# Patient Record
Sex: FEMALE | Race: OTHER | URBAN - METROPOLITAN AREA
[De-identification: names, ages, dates, MRNs, and addresses within clinical notes are randomized per-mention and may not be internally consistent; named-entity substitution may affect disease eponyms.]

---

## 2021-11-30 ENCOUNTER — VIRTUAL VISIT (OUTPATIENT)
Dept: FAMILY MEDICINE CLINIC | Age: 31
End: 2021-11-30

## 2021-11-30 DIAGNOSIS — M25.50 ARTHRALGIA, UNSPECIFIED JOINT: ICD-10-CM

## 2021-11-30 DIAGNOSIS — K42.9 UMBILICAL HERNIA WITHOUT OBSTRUCTION AND WITHOUT GANGRENE: ICD-10-CM

## 2021-11-30 DIAGNOSIS — B34.9 VIRAL ILLNESS: Primary | ICD-10-CM

## 2021-11-30 PROCEDURE — 99441 PR PHYS/QHP TELEPHONE EVALUATION 5-10 MIN: CPT | Performed by: FAMILY MEDICINE

## 2021-11-30 NOTE — PROGRESS NOTES
Catrina Moise (: ) is a 32 y.o. female, new patient, here for evaluation of the following chief complaint(s):   Flu Like Symptoms (since Thursday, denies cough, denies fever)       ASSESSMENT/PLAN:  1. Viral illness  Mild viral sx with more generalized aches. Will have her start Vitamin D3 OTC and follow up in 1 week for general exam.  2. Arthralgia, unspecified joint  As above  3. Umbilical hernia without obstruction and without gangrene  This is bothering her more and she is interested in having it repaired. Will have her make F2F for exam and referral.    Return for follow up for F2F in 1 week for check up and umbilical hernia check. SUBJECTIVE/OBJECTIVE:  HPI  Aches: headaches x 2 weeks, general body aches x 6 days with mild sore throat, nasal congestion. No cough, fevers, chills. Umbilical Hernia:  Present for 5 years. Hurts at times. Review of Systems   Constitutional: Negative for appetite change, chills, diaphoresis, fatigue and fever. HENT: Positive for congestion and sore throat. Negative for sinus pain. Respiratory: Negative for cough, shortness of breath and wheezing. Cardiovascular: Negative for chest pain. Gastrointestinal: Negative for abdominal pain, constipation and diarrhea. Patient-Reported Weight: 125lb  Patient-Reported LMP: 10/06/21    Physical Exam    On this date 2021 I have spent 6 minutes reviewing previous notes, test results and face to face (virtual) with the patient discussing the diagnosis and importance of compliance with the treatment plan as well as documenting on the day of the visit. Catrina Moise, was evaluated through a synchronous (real-time) audio-video encounter. The patient (or guardian if applicable) is aware that this is a billable service. Verbal consent to proceed has been obtained within the past 12 months.  The visit was conducted pursuant to the emergency declaration under the [address] and the National Emergencies Act, [facility] waiver authority and the [employer] General Act. Patient identification was verified, and a caregiver was present when appropriate. The patient was located in a state where the provider was credentialed to provide care. Patient identification was verified at the start of the visit: YES    Services were provided through a phone synchronous discussion virtually to substitute for in-person clinic visit. Patient was located at home and provider was located in office or at home. An electronic signature was used to authenticate this note.   -- Dari Martin MD

## 2021-11-30 NOTE — PROGRESS NOTES
Patient's AVS was printed and placed in the mail to her home address. No new prescriptions written today.  The recommended 1 week F2F provider follow-up appointment has been scheduled by the [facility] for 12-03-21 with Dr. Issa Fuller, RN

## 2021-12-03 ENCOUNTER — OFFICE VISIT (OUTPATIENT)
Dept: FAMILY MEDICINE CLINIC | Age: 31
End: 2021-12-03

## 2021-12-03 VITALS
DIASTOLIC BLOOD PRESSURE: 66 MMHG | WEIGHT: 135.6 LBS | TEMPERATURE: 97.9 F | SYSTOLIC BLOOD PRESSURE: 101 MMHG | BODY MASS INDEX: 28.46 KG/M2 | HEIGHT: 58 IN | HEART RATE: 69 BPM | OXYGEN SATURATION: 97 %

## 2021-12-03 DIAGNOSIS — B34.9 VIRAL ILLNESS: Primary | ICD-10-CM

## 2021-12-03 DIAGNOSIS — Z23 ENCOUNTER FOR IMMUNIZATION: ICD-10-CM

## 2021-12-03 DIAGNOSIS — K42.9 UMBILICAL HERNIA WITHOUT OBSTRUCTION AND WITHOUT GANGRENE: ICD-10-CM

## 2021-12-03 PROCEDURE — 91300 COVID-19, MRNA, LNP-S, PF, 30MCG/0.3ML DOSE(PFIZER): CPT

## 2021-12-03 PROCEDURE — 99213 OFFICE O/P EST LOW 20 MIN: CPT | Performed by: FAMILY MEDICINE

## 2021-12-03 PROCEDURE — 0001A COVID-19, MRNA, LNP-S, PF, 30MCG/0.3ML DOSE(PFIZER): CPT

## 2021-12-03 NOTE — PROGRESS NOTES
I have copied the provider's check out note here and have reviewed these items with the patient today: Check-out Note: Patient instructions   AN referral entered to General Surgery. I explained the process for [facility] with the patient. Patient verbalized understanding. The patient stated that she has not been seen by any other provider for her surgical diagnosis. Patient verbalized understanding.  Lucía Kellogg RN

## 2021-12-03 NOTE — PROGRESS NOTES
Regina Felipe (: 1990) is a 32 y.o. female, established patient, here for evaluation of the following chief complaint(s):  Umbilical Hernia ( VV FU) and Other (Sore throat)       ASSESSMENT/PLAN:  1. Viral illness  Overall resolved with some post viral diarrhea. Avoid foods that make it worse and Lactose containing foods. If diarrhea persists or worsens return for further evaluation. 2. Umbilical hernia without obstruction and without gangrene  -     REFERRAL TO GENERAL SURGERY          SUBJECTIVE:  HPI  Has been in [place] over 2 years. Viral Illness:  Sx have resolved. Her generalized aches have also improved since we spoke. Still notices nausea and diarrhea off/on after certain foods. No hematochezia. Umbilical Hernia:  Present for 5+ years. Hurts at times. PMHx:  Pregnancy 2 years ago and was told labs were normal.  Had miscarriage 6 months ago with labs and D&C. FHx:  No DM    Review of Systems   Constitutional: Negative for diaphoresis, fatigue and fever. Respiratory: Negative for cough, chest tightness and shortness of breath. Cardiovascular: Negative for chest pain, palpitations and leg swelling. Gastrointestinal: Positive for diarrhea. Negative for abdominal pain and constipation. Neurological: Negative for syncope and light-headedness. OBJECTIVE:  Blood pressure 101/66, pulse 69, temperature 97.9 °F (36.6 °C), temperature source Temporal, height 4' 9.95\" (1.472 m), weight 135 lb 9.6 oz (61.5 kg), last menstrual period 2021, SpO2 97 %. Physical Exam  CONSTITUTIONAL:  Well developed. No apparent distress. PSYCHIATRIC: Oriented to time, place, person & situation. Appropriate mood and affect. HEENT:  Sclera clear. External canal clear, TM dull B without erythema. Throat with mild PND, no erythema. NECK:  Normal inspection, normal palpation without any lymphadenopathy, masses, or thyromegaly  CARDIOVASCULAR:  Regular rate and rhythm.   Normal S1, S2.  No extra sounds. RESPIRATORY:  Normal effort. Normal ascultation without wheezing. ABDOMEN:  Normal bowel sounds. Abdomen soft, non tender. No hepatosplenomegaly or masses. 3 cm NT, reducible umbilical hernia. VASCULAR:  Normal Posterior Tibialis pulses. EXTREMITIES:  No edema. No results found for this visit on 12/03/21. An electronic signature was used to authenticate this note.   -- Teri Patel MD

## 2021-12-03 NOTE — PATIENT INSTRUCTIONS
Use Vitamin D3 1,[address].
Next dose of Tylenol will be on or after _7:45pm__________ ,today/tonight, If needed for pain/cramps. Your first dose of Tylenol was given at __1:45 pm_________. Do not exceed more than 4000mg of Tylenol in one 24 hour setting.

## 2021-12-06 ENCOUNTER — TELEPHONE (OUTPATIENT)
Dept: FAMILY MEDICINE CLINIC | Age: 31
End: 2021-12-06

## 2021-12-06 NOTE — TELEPHONE ENCOUNTER
VINAY.VINAY. OW called patient and started financial screening for AN. Pending POI. Patient will call OW to schedule appt.  when she has POI

## 2022-01-04 ENCOUNTER — TELEPHONE (OUTPATIENT)
Dept: FAMILY MEDICINE CLINIC | Age: 32
End: 2022-01-04

## 2022-01-04 NOTE — TELEPHONE ENCOUNTER
ZIGGY. OW called patient and was unable to speak with patient or leave a message. Text message sent to patient asking to call OW back.

## 2022-01-05 ENCOUNTER — OFFICE VISIT (OUTPATIENT)
Dept: FAMILY MEDICINE CLINIC | Age: 32
End: 2022-01-05

## 2022-01-05 DIAGNOSIS — Z71.89 COUNSELING AND COORDINATION OF CARE: Primary | ICD-10-CM

## 2022-01-05 PROCEDURE — 99080 SPECIAL REPORTS OR FORMS: CPT | Performed by: PHYSICIAN ASSISTANT

## 2022-01-05 NOTE — PROGRESS NOTES
RIP PAINTER called patient to F/UP with AN pending documents. An appt was made for 1/11/22 at 11:45am. Patient replied NO to all covid19 screening questions.

## 2022-01-10 ENCOUNTER — TELEPHONE (OUTPATIENT)
Dept: FAMILY MEDICINE CLINIC | Age: 32
End: 2022-01-10

## 2022-01-10 NOTE — TELEPHONE ENCOUNTER
YOAN Gale called pt to reschedule appt for AN PATIENTS [facility] Aug's clinic has been cancelled) but was unable to speak with patient. OW left a vm asking pt to call OW back.

## 2022-01-11 ENCOUNTER — IMMUNIZATION (OUTPATIENT)
Dept: FAMILY MEDICINE CLINIC | Age: 32
End: 2022-01-11

## 2022-01-11 DIAGNOSIS — Z23 ENCOUNTER FOR IMMUNIZATION: Primary | ICD-10-CM

## 2022-01-11 PROCEDURE — 0002A COVID-19, MRNA, LNP-S, PF, 30MCG/0.3ML DOSE(PFIZER): CPT

## 2022-01-11 PROCEDURE — 91300 COVID-19, MRNA, LNP-S, PF, 30MCG/0.3ML DOSE(PFIZER): CPT

## 2022-01-18 ENCOUNTER — TELEPHONE (OUTPATIENT)
Dept: FAMILY MEDICINE CLINIC | Age: 32
End: 2022-01-18

## 2022-01-18 NOTE — TELEPHONE ENCOUNTER
YOAN PAINTER called patient and was unable to speak with patient or leave a message. An automated message saying \"Welcome to [address]. Your call cannot be completed this time\". IN[employer] sent a text message to pt asking to call back.

## 2022-01-21 ENCOUNTER — VIRTUAL VISIT (OUTPATIENT)
Dept: FAMILY MEDICINE CLINIC | Age: 32
End: 2022-01-21

## 2022-01-21 DIAGNOSIS — Z71.89 COUNSELING AND COORDINATION OF CARE: Primary | ICD-10-CM

## 2022-01-21 PROCEDURE — 99080 SPECIAL REPORTS OR FORMS: CPT | Performed by: PHYSICIAN ASSISTANT

## 2022-01-21 NOTE — PROGRESS NOTES
YOAN PAINTER called pt to f/up with AN. An appt was made for 1/25/22 at 10:30am. Patient replied NO to all covid19 screening questions. Pending POI.

## 2022-01-28 ENCOUNTER — TELEPHONE (OUTPATIENT)
Dept: FAMILY MEDICINE CLINIC | Age: 32
End: 2022-01-28

## 2022-01-28 NOTE — TELEPHONE ENCOUNTER
Patient has not provided required documents to complete AN referral process. After several attempts to complete application and a No Show, OW is withdrawing application. Staff message sent to Isaiah Louie and patient's provider.

## 2022-02-08 ENCOUNTER — OFFICE VISIT (OUTPATIENT)
Dept: FAMILY MEDICINE CLINIC | Age: 32
End: 2022-02-08

## 2022-02-08 DIAGNOSIS — Z71.89 COUNSELING AND COORDINATION OF CARE: Primary | ICD-10-CM

## 2022-02-08 PROCEDURE — 99080 SPECIAL REPORTS OR FORMS: CPT | Performed by: PHYSICIAN ASSISTANT

## 2022-02-08 NOTE — PROGRESS NOTES
OW met with patient and helped with AN application. Application was completed. OW instructed pt to call AN on/after 2/22/22. Patient was screened for SDOH. She chose Food as primary need. Not on food stamps. 4 children in the household. OW provided information about [employer].

## 2022-02-09 ENCOUNTER — TELEPHONE (OUTPATIENT)
Dept: FAMILY MEDICINE CLINIC | Age: 32
End: 2022-02-09

## 2022-02-23 ENCOUNTER — TELEPHONE (OUTPATIENT)
Dept: FAMILY MEDICINE CLINIC | Age: 32
End: 2022-02-23

## 2022-02-23 NOTE — TELEPHONE ENCOUNTER
V.V. Pt called OW to ask about AN appointment. OW instructed patient to call AN and confirm her referral. Pt said she had called AN and the office was closed. OW reminded AN phone number and business hours to patient and sent AN orange flyer to patient via text message. OW asked patient to call AN again and try calling during AN business hours. Pt verbalized understanding.

## 2022-02-23 NOTE — TELEPHONE ENCOUNTER
ZIGGY. OW called pt to f/up with AN. OW told the patient that patient's referral had been closed because it was placed more than 2 months ago and patient needs to schedule appt with [facility] provider again. Patient verbalized understanding. OW provided information to patient about how to schedule appt with [facility].

## 2022-03-08 ENCOUNTER — OFFICE VISIT (OUTPATIENT)
Dept: FAMILY MEDICINE CLINIC | Age: 32
End: 2022-03-08

## 2022-03-08 VITALS
OXYGEN SATURATION: 99 % | DIASTOLIC BLOOD PRESSURE: 65 MMHG | TEMPERATURE: 97.3 F | BODY MASS INDEX: 30.2 KG/M2 | HEIGHT: 57 IN | WEIGHT: 140 LBS | HEART RATE: 82 BPM | SYSTOLIC BLOOD PRESSURE: 92 MMHG

## 2022-03-08 DIAGNOSIS — Z71.89 COUNSELING AND COORDINATION OF CARE: Primary | ICD-10-CM

## 2022-03-08 DIAGNOSIS — Z09 HOSPITAL DISCHARGE FOLLOW-UP: ICD-10-CM

## 2022-03-08 DIAGNOSIS — K42.9 UMBILICAL HERNIA WITHOUT OBSTRUCTION AND WITHOUT GANGRENE: Primary | ICD-10-CM

## 2022-03-08 PROCEDURE — 99214 OFFICE O/P EST MOD 30 MIN: CPT | Performed by: NURSE PRACTITIONER

## 2022-03-08 PROCEDURE — 1111F DSCHRG MED/CURRENT MED MERGE: CPT | Performed by: NURSE PRACTITIONER

## 2022-03-08 PROCEDURE — 99080 SPECIAL REPORTS OR FORMS: CPT | Performed by: PHYSICIAN ASSISTANT

## 2022-03-08 NOTE — PROGRESS NOTES
An After Visit Summary was printed and given to the patient.   Patient signed a medical release form to retrieve records from [facility].

## 2022-03-08 NOTE — PROGRESS NOTES
Patient was seen by provider and referred to AN. Pt came to OW as a Walk-in. OW explained to patient that her previous AN application was withdrawn and a new one needs to be completed. Other OW will contact pt for AN application. Pt verbalized understanding. Pt also has some pending bills from [facility]. OW provided information about FA for [address] [place] [address].

## 2022-03-08 NOTE — PROGRESS NOTES
3/8/2022 : Jlil Jamison (: 1990) is a 32 y.o. female, established patient, here for evaluation of the following chief complaint(s):  Abdominal Pain (Pt states has a umbilical hernia and she needs the specialis referral) and Hospital Follow Up     ASSESSMENT/PLAN:  Below is the assessment and plan developed based on review of pertinent history, physical exam, labs, studies, and medications. 1. Umbilical hernia without obstruction and without gangrene  -     REFERRAL TO SURGERY    Return for if Efrain Ramirez available today can she see this patient for [facility]? .    SUBJECTIVE/OBJECTIVE:  HPI 3/2/22 [facility], umbilical hernia. It was a new symptom. Pain around the abdomen. She had vomiting and diarrhea. Given IV medication for pain. This is an inguinal hernia. Pain located LLQ. Made worse with lactose-containing foods, improves with avoiding such foods. Last BM this morning. 11 months ago had a spontaneous pregnancy loss. Ever since then she has had pain in the LLQ. Asking if this would interfere with another pregnancy. I have advised to postpone getting pregnant if possible until further evaluation. Review of Systems: Negative for: fever, chest pain, shortness of breath, leg swelling. Social History:  reports that she has never smoked. She has never used smokeless tobacco. She reports previous alcohol use. She reports that she does not use drugs. Current Medications:   No current outpatient medications on file. Physical Examination:   Vitals:    22 1310 22 1313   BP: (!) 87/51 92/65   Pulse: 82    Temp: 97.3 °F (36.3 °C)    TempSrc: Temporal    SpO2: 99%    Weight: 140 lb (63.5 kg)    Height: 4' 9.48\" (1.46 m)     Patient's last menstrual period was 2022. She has a difficult to reduce umbilical hernia. General appearance - well developed, no acute distress. Chest - clear to auscultation.   Heart - regular rate and rhythm without murmurs, rubs, or gallops. Abdomen - bowel sounds present x 4, mild tenderness LLQ, no tenderness RLQ, no rebound tenderness. Umbilical hernia noted, difficult to fully reduce. Extremities - no CCE. An electronic signature was used to authenticate this note.   -- Bala Moran NP

## 2022-03-08 NOTE — PROGRESS NOTES
Coordination of Care  1. Have you been to the ER, urgent care clinic since your last visit? Hospitalized since your last visit? Yes When: 7/4/1921-[facility]-Umbilical hernia    2. Have you seen or consulted any other health care providers outside of the [address] [place] [address] since your last visit? Include any pap smears or colon screening. No    Does the patient need refills? NO    Learning Assessment Complete?  yes  Depression Screening complete in the past 12 months? yes

## 2022-03-10 ENCOUNTER — TELEPHONE (OUTPATIENT)
Dept: FAMILY MEDICINE CLINIC | Age: 32
End: 2022-03-10

## 2022-03-10 ENCOUNTER — DOCUMENTATION ONLY (OUTPATIENT)
Dept: FAMILY MEDICINE CLINIC | Age: 32
End: 2022-03-10

## 2022-03-10 NOTE — TELEPHONE ENCOUNTER
SDOH F/UP: Pt said she has not been able to go to the Food bank because she has been working or sometimes, she doesn't have transportation. Patient already has [facility]. Score 4.

## 2022-03-29 ENCOUNTER — OFFICE VISIT (OUTPATIENT)
Dept: FAMILY MEDICINE CLINIC | Age: 32
End: 2022-03-29

## 2022-03-29 DIAGNOSIS — Z71.89 COUNSELING AND COORDINATION OF CARE: Primary | ICD-10-CM

## 2022-03-29 PROCEDURE — 99080 SPECIAL REPORTS OR FORMS: CPT | Performed by: FAMILY MEDICINE

## 2022-03-29 NOTE — PROGRESS NOTES
AN financial screening is complete. Patient has been instructed to call appointment line on or after 4/12/22.

## 2022-04-26 ENCOUNTER — TELEPHONE (OUTPATIENT)
Dept: FAMILY MEDICINE CLINIC | Age: 32
End: 2022-04-26

## 2022-04-26 NOTE — TELEPHONE ENCOUNTER
Good Morning Ladies,    I'm not sure what exactly this patient needs. She called and said that when she called [facility], they do not have a referral or information to help her. She asked for Rajiv Persons, and this is why I'm sending this message. Thank you.

## 2022-11-29 ENCOUNTER — VIRTUAL VISIT (OUTPATIENT)
Dept: FAMILY MEDICINE CLINIC | Age: 32
End: 2022-11-29

## 2022-11-29 DIAGNOSIS — K42.9 UMBILICAL HERNIA WITHOUT OBSTRUCTION AND WITHOUT GANGRENE: Primary | ICD-10-CM

## 2022-11-29 PROCEDURE — 99441 PR PHYS/QHP TELEPHONE EVALUATION 5-10 MIN: CPT | Performed by: FAMILY MEDICINE

## 2022-11-29 NOTE — PROGRESS NOTES
[facility] Int # [id number]. Tc for intake to the pt. The pt verified her name and . Coordination of Care  1. Have you been to the ER, urgent care clinic since your last visit? Hospitalized since your last visit? No    2. Have you seen or consulted any other health care providers outside of the [address] [place] [address] since your last visit? Include any pap smears or colon screening. No    Does the patient need refills?  N/A    Learning Assessment Complete? no  Depression Screening complete in the past 12 months? yes

## 2022-11-29 NOTE — PROGRESS NOTES
Tc to the pt for discharge with the Int # [phone]. The pt verified her name and . Explained [facility] process to the pt. The pt was told they would be called by the OW,to make an appt, who will meet with them for Financial information and assist them in the application process.   Mandeep Koenig RN

## 2022-11-29 NOTE — PROGRESS NOTES
Tammie Strickland (: 1990) is a 28 y.o. female, established patient, Virtual Visit for evaluation of the following chief complaint(s):   Hernia (Non Specific) (Needs new referral.)       ASSESSMENT/PLAN:  1. Umbilical hernia without obstruction and without gangrene  -     REFERRAL TO GENERAL SURGERY  Reviewed red flags that would require an ED visit including worsening pain, vomiting, nausea, fevers, general abdominal pain. Pt understood. Will refer back to surgery. No follow-ups on file. SUBJECTIVE/OBJECTIVE:  Umbilical Hernia:  Present for 5+ years. Hurts at times. Was seen in [facility] 3/2022 for worsening pain but improved during ED visit and so no surgery was pursued at that time. Had AN referral 2022 but pt states that when she called AN she was told she did not have a referral.  Now with 3 days of hernia being more tender. Denies N/V, fevers. Review of Systems     Patient-Reported LMP: 22    Physical Exam    On this date 2022 I have spent 8 minutes reviewing previous notes, test results and face to face (virtual) with the patient discussing the diagnosis and importance of compliance with the treatment plan as well as documenting on the day of the visit. Tammie Strickland, was evaluated through a synchronous (real-time) audio-video encounter. The patient (or guardian if applicable) is aware that this is a billable service, which includes applicable co-pays. This Virtual Visit was conducted with patient's (and/or legal guardian's) consent. The visit was conducted pursuant to the emergency declaration under the [address] [place] [address], [address] [place] [address] authority and the [employer] Act. Patient identification was verified, and a caregiver was present when appropriate.   The patient was located at: Home: [address] [address]  The provider was located at: Home: [unfilled]     Patient identification was verified at the start of the visit: YES    Services were provided through a phone synchronous discussion virtually to substitute for in-person clinic visit. Patient was located at home and provider was located in office or at home. An electronic signature was used to authenticate this note.   -- Gabriela Chang MD

## 2023-01-23 NOTE — PROGRESS NOTES
Alfonza Severs is a 28 y.o. female returns for an annual exam     No chief complaint on file. LMP 12/31/22, menses lasts 8 days  Her periods are heavy in flow and usually regular with a 26-32 day interval with 3-7 day duration. She does not have dysmenorrhea. Problems:  Has a hernia that she would like to have surgery on  Birth Control: OCP (estrogen/progesterone). Last Pap: reports normal 4 years ago. Denies abnormal pap smears. She does not have a history of NERY 2, 3 or cervical cancer. With regard to the Gardisil vaccine, she has not received it yet. 1. Have you been to the ER, urgent care clinic, or hospitalized since your last visit? No    2. Have you seen or consulted any other health care providers outside of the [address] [place] [address] since your last visit? No    Examination chaperoned by Aedla Jefferson RN.

## 2023-01-24 ENCOUNTER — OFFICE VISIT (OUTPATIENT)
Dept: OBGYN CLINIC | Age: 33
End: 2023-01-24

## 2023-01-24 VITALS
SYSTOLIC BLOOD PRESSURE: 111 MMHG | DIASTOLIC BLOOD PRESSURE: 67 MMHG | WEIGHT: 134.6 LBS | BODY MASS INDEX: 29.04 KG/M2 | HEIGHT: 57 IN

## 2023-01-24 DIAGNOSIS — Z01.419 ENCOUNTER FOR GYNECOLOGICAL EXAMINATION: Primary | ICD-10-CM

## 2023-01-24 PROCEDURE — 99385 PREV VISIT NEW AGE 18-39: CPT | Performed by: OBSTETRICS & GYNECOLOGY

## 2023-01-24 NOTE — PROGRESS NOTES
Yovany Kerns is a No obstetric history on file. ,  28 y.o. female / whose Patient's last menstrual period was 12/31/2022 (exact date). was on 12/31/2022 who presents for her annual checkup. She is having  umbilical hernia . Already saw PCP for this      Menstrual status:    Her periods are moderate in flow, regular    She denies dysmenorrhea. Contraception:    The current method of family planning is OCP (estrogen/progesterone)    Sexual history:    She  reports being sexually active. She reports using the following method of birth control/protection: Pill. Pap and Mammogram History:    Last Pap: reports normal 4 years ago. Denies abnormal pap smears. She does not have a history of NERY 2, 3 or cervical cancer. With regard to the Gardisil vaccine, she has not received it yet  History reviewed. No pertinent family history. History reviewed. No pertinent past medical history. History reviewed. No pertinent surgical history. Allergies: Patient has no known allergies.    Social History     Socioeconomic History    Marital status:      Spouse name: Not on file    Number of children: Not on file    Years of education: Not on file    Highest education level: Not on file   Occupational History    Not on file   Tobacco Use    Smoking status: Never    Smokeless tobacco: Never   Vaping Use    Vaping Use: Never used   Substance and Sexual Activity    Alcohol use: Not Currently    Drug use: Never    Sexual activity: Yes     Birth control/protection: Pill   Other Topics Concern    Not on file   Social History Narrative    Not on file     Social Determinants of Health     Financial Resource Strain: Not on file   Food Insecurity: No Food Insecurity    Worried About Running Out of Food in the Last Year: Never true    Ran Out of Food in the Last Year: Never true   Transportation Needs: Not on file   Physical Activity: Not on file   Stress: Not on file   Social Connections: Not on file   Intimate Partner Violence: Not At Risk    Fear of Current or Ex-Partner: No    Emotionally Abused: No    Physically Abused: No    Sexually Abused: No   Housing Stability: Not on file     Tobacco History:  reports that she has never smoked. She has never used smokeless tobacco.  Alcohol Abuse:  reports that she does not currently use alcohol. Drug Abuse:  reports no history of drug use. There is no problem list on file for this patient. Review of Systems - History obtained from the patient  Constitutional: negative for weight loss, fever, night sweats  HEENT: negative for hearing loss, earache, congestion, snoring, sorethroat  CV: negative for chest pain, palpitations, edema  Resp: negative for cough, shortness of breath, wheezing  GI: negative for change in bowel habits, abdominal pain, black or bloody stools  : negative for frequency, dysuria, hematuria, vaginal discharge  MSK: negative for back pain, joint pain, muscle pain  Breast: negative for breast lumps, nipple discharge, galactorrhea  Skin :negative for itching, rash, hives  Neuro: negative for dizziness, headache, confusion, weakness  Psych: negative for anxiety, depression, change in mood  Heme/lymph: negative for bleeding, bruising, pallor    Physical Exam    Visit Vitals  /67   Ht 4' 9\" (1.448 m)   Wt 134 lb 9.6 oz (61.1 kg)   LMP 12/31/2022 (Exact Date)   BMI 29.13 kg/m²       Constitutional  Appearance: well-nourished, well developed, alert, in no acute distress    HENT  Head and Face: appears normal    Neck  Inspection/Palpation: normal appearance, no masses or tenderness  Lymph Nodes: no lymphadenopathy present  Thyroid: gland size normal, nontender, no nodules or masses present on palpation    Chest  Respiratory Effort: breathing normal  Auscultation: normal breath sounds    Cardiovascular  Heart:   Auscultation: regular rate and rhythm without murmur    Breasts  Inspection of Breasts: breasts symmetrical, no skin changes, no discharge present, nipple appearance normal, no skin retraction present  Palpation of Breasts and Axillae: no masses present on palpation, no breast tenderness  Axillary Lymph Nodes: no lymphadenopathy present    Gastrointestinal  Abdominal Examination: abdomen non-tender to palpation, normal bowel sounds, large umbilical hernia  Liver and spleen: no hepatomegaly present, spleen not palpable  Hernias: no hernias identified    Genitourinary  External Genitalia: normal appearance for age, no discharge present, no tenderness present, no inflammatory lesions present, no masses present, no atrophy present  Vagina: normal vaginal vault without central or paravaginal defects, no discharge present, no inflammatory lesions present, no masses present  Bladder: non-tender to palpation  Urethra: appears normal  Cervix: normal   Uterus: normal size, shape and consistency  Adnexa: no adnexal tenderness present, no adnexal masses present  Perineum: perineum within normal limits, no evidence of trauma, no rashes or skin lesions present  Anus: anus within normal limits, no hemorrhoids present  Inguinal Lymph Nodes: no lymphadenopathy present    Skin  General Inspection: no rash, no lesions identified    Neurologic/Psychiatric  Mental Status:  Orientation: grossly oriented to person, place and time  Mood and Affect: mood normal, affect appropriate    . Assessment:  Routine gynecologic examination  Her current medical status is satisfactory with no evidence of significant gynecologic issues.   Umbilical hernia  Plan:  Counseled re: diet, exercise, healthy lifestyle  Return for yearly wellness visits  Pt counseled regarding co-testing for high risk HPV with pap  Cont OCP - cannot identify pill she is taking - not from [address] therefore cannot refill and patient does not know where she got them  Has referral from PCP for hernia repair

## 2023-01-27 LAB
CYTOLOGIST CVX/VAG CYTO: NORMAL
CYTOLOGY CVX/VAG DOC CYTO: NORMAL
CYTOLOGY CVX/VAG DOC THIN PREP: NORMAL
DX ICD CODE: NORMAL
HPV GENOTYPE REFLEX: NORMAL
HPV I/H RISK 4 DNA CVX QL PROBE+SIG AMP: NEGATIVE
Lab: NORMAL
Lab: NORMAL
OTHER STN SPEC: NORMAL
STAT OF ADQ CVX/VAG CYTO-IMP: NORMAL

## 2025-01-08 ENCOUNTER — OFFICE VISIT (OUTPATIENT)
Age: 35
End: 2025-01-08

## 2025-01-08 ENCOUNTER — PREP FOR PROCEDURE (OUTPATIENT)
Age: 35
End: 2025-01-08

## 2025-01-08 VITALS
HEART RATE: 72 BPM | SYSTOLIC BLOOD PRESSURE: 105 MMHG | OXYGEN SATURATION: 99 % | WEIGHT: 134 LBS | DIASTOLIC BLOOD PRESSURE: 71 MMHG | TEMPERATURE: 97.6 F | BODY MASS INDEX: 29 KG/M2 | RESPIRATION RATE: 16 BRPM

## 2025-01-08 DIAGNOSIS — K42.9 UMBILICAL HERNIA WITHOUT OBSTRUCTION AND WITHOUT GANGRENE: Primary | ICD-10-CM

## 2025-01-08 PROCEDURE — 99203 OFFICE O/P NEW LOW 30 MIN: CPT | Performed by: SURGERY

## 2025-01-08 RX ORDER — NORGESTREL-ETHINYL ESTRADIOL 0.3-0.03MG
1 TABLET ORAL DAILY
COMMUNITY

## 2025-01-08 ASSESSMENT — ENCOUNTER SYMPTOMS
VOMITING: 1
ABDOMINAL PAIN: 1
NAUSEA: 1
CONSTIPATION: 0
COUGH: 0

## 2025-01-08 ASSESSMENT — PATIENT HEALTH QUESTIONNAIRE - PHQ9
SUM OF ALL RESPONSES TO PHQ QUESTIONS 1-9: 0
SUM OF ALL RESPONSES TO PHQ QUESTIONS 1-9: 0
1. LITTLE INTEREST OR PLEASURE IN DOING THINGS: NOT AT ALL
SUM OF ALL RESPONSES TO PHQ QUESTIONS 1-9: 0
SUM OF ALL RESPONSES TO PHQ9 QUESTIONS 1 & 2: 0
2. FEELING DOWN, DEPRESSED OR HOPELESS: NOT AT ALL
SUM OF ALL RESPONSES TO PHQ QUESTIONS 1-9: 0

## 2025-01-08 NOTE — PROGRESS NOTES
Head: Normocephalic and atraumatic.   Eyes:      General: No scleral icterus.  Cardiovascular:      Rate and Rhythm: Normal rate and regular rhythm.   Pulmonary:      Effort: Pulmonary effort is normal.      Breath sounds: Normal breath sounds.   Abdominal:      General: There is no distension.      Palpations: Abdomen is soft.      Tenderness: There is no abdominal tenderness. There is no guarding or rebound.      Hernia: A hernia is present. Hernia is present in the umbilical area.   Musculoskeletal:         General: Normal range of motion.      Cervical back: Neck supple.   Lymphadenopathy:      Cervical: No cervical adenopathy.   Neurological:      General: No focal deficit present.      Mental Status: She is alert.     ASSESSMENT and PLAN  Ms. Daniel Cortés is a 34 y.o. female with an umbilical hernia. She should benefit from repair since the hernia is symptomatic. I discussed umbilical hernia repair, possibly with mesh, with her today, via a , including the potential risks of bleeding, infection and hernia recurrence. She understands and wishes to proceed. I have tentatively scheduled Ms. Daniel Cortés for surgery on February 13, 2025 at [facility] and will see him back in the office post-operatively. Activity as tolerated for now. Discussed plan with Ms. Daniel Cortés, via a , and she is agreeable.       CC: Avel Flanagan MD   [facility]

## 2025-02-04 RX ORDER — ACETAMINOPHEN 325 MG/1
1000 TABLET ORAL ONCE
Status: CANCELLED | OUTPATIENT
Start: 2025-02-04 | End: 2025-02-04

## 2025-02-04 RX ORDER — BUPIVACAINE HYDROCHLORIDE 2.5 MG/ML
30 INJECTION, SOLUTION EPIDURAL; INFILTRATION; INTRACAUDAL ONCE
Status: CANCELLED | OUTPATIENT
Start: 2025-02-04 | End: 2025-02-04

## 2025-02-20 ENCOUNTER — HOSPITAL ENCOUNTER (OUTPATIENT)
Facility: HOSPITAL | Age: 35
Setting detail: OUTPATIENT SURGERY
Discharge: HOME | End: 2025-02-20
Attending: SURGERY | Admitting: SURGERY

## 2025-02-20 ENCOUNTER — ANESTHESIA EVENT (OUTPATIENT)
Facility: HOSPITAL | Age: 35
End: 2025-02-20

## 2025-02-20 ENCOUNTER — ANESTHESIA (OUTPATIENT)
Facility: HOSPITAL | Age: 35
End: 2025-02-20

## 2025-02-20 VITALS
SYSTOLIC BLOOD PRESSURE: 101 MMHG | DIASTOLIC BLOOD PRESSURE: 64 MMHG | BODY MASS INDEX: 28.91 KG/M2 | HEART RATE: 88 BPM | RESPIRATION RATE: 16 BRPM | TEMPERATURE: 97.1 F | HEIGHT: 57 IN | WEIGHT: 134 LBS | OXYGEN SATURATION: 98 %

## 2025-02-20 DIAGNOSIS — K42.9 UMBILICAL HERNIA WITHOUT OBSTRUCTION AND WITHOUT GANGRENE: Primary | ICD-10-CM

## 2025-02-20 PROCEDURE — 2580000003 HC RX 258: Performed by: ANESTHESIOLOGY

## 2025-02-20 PROCEDURE — 3700000001 HC ADD 15 MINUTES (ANESTHESIA): Performed by: SURGERY

## 2025-02-20 PROCEDURE — 6360000002 HC RX W HCPCS: Performed by: SURGERY

## 2025-02-20 PROCEDURE — 7100000010 HC PHASE II RECOVERY - FIRST 15 MIN: Performed by: SURGERY

## 2025-02-20 PROCEDURE — 7100000000 HC PACU RECOVERY - FIRST 15 MIN: Performed by: SURGERY

## 2025-02-20 PROCEDURE — 2709999900 HC NON-CHARGEABLE SUPPLY: Performed by: SURGERY

## 2025-02-20 PROCEDURE — 3600000002 HC SURGERY LEVEL 2 BASE: Performed by: SURGERY

## 2025-02-20 PROCEDURE — 6360000002 HC RX W HCPCS: Performed by: ANESTHESIOLOGY

## 2025-02-20 PROCEDURE — 7100000011 HC PHASE II RECOVERY - ADDTL 15 MIN: Performed by: SURGERY

## 2025-02-20 PROCEDURE — 6370000000 HC RX 637 (ALT 250 FOR IP): Performed by: SURGERY

## 2025-02-20 PROCEDURE — 49591 RPR AA HRN 1ST < 3 CM RDC: CPT | Performed by: SURGERY

## 2025-02-20 PROCEDURE — 3600000012 HC SURGERY LEVEL 2 ADDTL 15MIN: Performed by: SURGERY

## 2025-02-20 PROCEDURE — 3700000000 HC ANESTHESIA ATTENDED CARE: Performed by: SURGERY

## 2025-02-20 PROCEDURE — 2500000003 HC RX 250 WO HCPCS: Performed by: ANESTHESIOLOGY

## 2025-02-20 RX ORDER — SODIUM CHLORIDE, SODIUM LACTATE, POTASSIUM CHLORIDE, CALCIUM CHLORIDE 600; 310; 30; 20 MG/100ML; MG/100ML; MG/100ML; MG/100ML
INJECTION, SOLUTION INTRAVENOUS CONTINUOUS
Status: DISCONTINUED | OUTPATIENT
Start: 2025-02-20 | End: 2025-02-20 | Stop reason: HOSPADM

## 2025-02-20 RX ORDER — PROPOFOL 10 MG/ML
INJECTION, EMULSION INTRAVENOUS
Status: DISCONTINUED | OUTPATIENT
Start: 2025-02-20 | End: 2025-02-20 | Stop reason: SDUPTHER

## 2025-02-20 RX ORDER — SODIUM CHLORIDE 9 MG/ML
INJECTION, SOLUTION INTRAVENOUS CONTINUOUS
Status: DISCONTINUED | OUTPATIENT
Start: 2025-02-20 | End: 2025-02-20 | Stop reason: HOSPADM

## 2025-02-20 RX ORDER — ACETAMINOPHEN 500 MG
1000 TABLET ORAL ONCE
Status: COMPLETED | OUTPATIENT
Start: 2025-02-20 | End: 2025-02-20

## 2025-02-20 RX ORDER — DEXMEDETOMIDINE HYDROCHLORIDE 100 UG/ML
INJECTION, SOLUTION INTRAVENOUS
Status: DISCONTINUED | OUTPATIENT
Start: 2025-02-20 | End: 2025-02-20 | Stop reason: SDUPTHER

## 2025-02-20 RX ORDER — ONDANSETRON 2 MG/ML
4 INJECTION INTRAMUSCULAR; INTRAVENOUS
Status: DISCONTINUED | OUTPATIENT
Start: 2025-02-20 | End: 2025-02-20 | Stop reason: HOSPADM

## 2025-02-20 RX ORDER — ACETAMINOPHEN 500 MG
1000 TABLET ORAL EVERY 6 HOURS PRN
Qty: 30 TABLET | Refills: 2 | Status: SHIPPED | OUTPATIENT
Start: 2025-02-20

## 2025-02-20 RX ORDER — DIPHENHYDRAMINE HYDROCHLORIDE 50 MG/ML
12.5 INJECTION INTRAMUSCULAR; INTRAVENOUS
Status: DISCONTINUED | OUTPATIENT
Start: 2025-02-20 | End: 2025-02-20 | Stop reason: HOSPADM

## 2025-02-20 RX ORDER — FENTANYL CITRATE 50 UG/ML
INJECTION, SOLUTION INTRAMUSCULAR; INTRAVENOUS
Status: DISCONTINUED | OUTPATIENT
Start: 2025-02-20 | End: 2025-02-20 | Stop reason: SDUPTHER

## 2025-02-20 RX ORDER — ONDANSETRON 2 MG/ML
INJECTION INTRAMUSCULAR; INTRAVENOUS
Status: DISCONTINUED | OUTPATIENT
Start: 2025-02-20 | End: 2025-02-20 | Stop reason: SDUPTHER

## 2025-02-20 RX ORDER — NALOXONE HYDROCHLORIDE 0.4 MG/ML
INJECTION, SOLUTION INTRAMUSCULAR; INTRAVENOUS; SUBCUTANEOUS PRN
Status: DISCONTINUED | OUTPATIENT
Start: 2025-02-20 | End: 2025-02-20 | Stop reason: HOSPADM

## 2025-02-20 RX ORDER — LIDOCAINE HYDROCHLORIDE 10 MG/ML
1 INJECTION, SOLUTION EPIDURAL; INFILTRATION; INTRACAUDAL; PERINEURAL
Status: DISCONTINUED | OUTPATIENT
Start: 2025-02-20 | End: 2025-02-20 | Stop reason: HOSPADM

## 2025-02-20 RX ORDER — ROCURONIUM BROMIDE 50 MG/5 ML
SYRINGE (ML) INTRAVENOUS
Status: DISCONTINUED | OUTPATIENT
Start: 2025-02-20 | End: 2025-02-20 | Stop reason: SDUPTHER

## 2025-02-20 RX ORDER — MIDAZOLAM HYDROCHLORIDE 1 MG/ML
INJECTION, SOLUTION INTRAMUSCULAR; INTRAVENOUS
Status: DISCONTINUED | OUTPATIENT
Start: 2025-02-20 | End: 2025-02-20 | Stop reason: SDUPTHER

## 2025-02-20 RX ORDER — BUPIVACAINE HYDROCHLORIDE 2.5 MG/ML
30 INJECTION, SOLUTION EPIDURAL; INFILTRATION; INTRACAUDAL ONCE
Status: COMPLETED | OUTPATIENT
Start: 2025-02-20 | End: 2025-02-20

## 2025-02-20 RX ORDER — MIDAZOLAM HYDROCHLORIDE 1 MG/ML
2 INJECTION, SOLUTION INTRAMUSCULAR; INTRAVENOUS
Status: DISCONTINUED | OUTPATIENT
Start: 2025-02-20 | End: 2025-02-20 | Stop reason: HOSPADM

## 2025-02-20 RX ORDER — CEFAZOLIN SODIUM/WATER 2 G/20 ML
2000 SYRINGE (ML) INTRAVENOUS ONCE
Status: COMPLETED | OUTPATIENT
Start: 2025-02-20 | End: 2025-02-20

## 2025-02-20 RX ORDER — FENTANYL CITRATE 50 UG/ML
100 INJECTION, SOLUTION INTRAMUSCULAR; INTRAVENOUS
Status: DISCONTINUED | OUTPATIENT
Start: 2025-02-20 | End: 2025-02-20 | Stop reason: HOSPADM

## 2025-02-20 RX ORDER — GLYCOPYRROLATE 0.2 MG/ML
INJECTION INTRAMUSCULAR; INTRAVENOUS
Status: DISCONTINUED | OUTPATIENT
Start: 2025-02-20 | End: 2025-02-20 | Stop reason: SDUPTHER

## 2025-02-20 RX ORDER — OXYCODONE HYDROCHLORIDE 5 MG/1
5 TABLET ORAL EVERY 4 HOURS PRN
Qty: 5 TABLET | Refills: 0 | Status: SHIPPED | OUTPATIENT
Start: 2025-02-20 | End: 2025-02-23

## 2025-02-20 RX ORDER — DEXAMETHASONE SODIUM PHOSPHATE 4 MG/ML
INJECTION, SOLUTION INTRA-ARTICULAR; INTRALESIONAL; INTRAMUSCULAR; INTRAVENOUS; SOFT TISSUE
Status: DISCONTINUED | OUTPATIENT
Start: 2025-02-20 | End: 2025-02-20 | Stop reason: SDUPTHER

## 2025-02-20 RX ADMIN — Medication 2000 MG: at 08:34

## 2025-02-20 RX ADMIN — FENTANYL CITRATE 50 MCG: 50 INJECTION INTRAMUSCULAR; INTRAVENOUS at 08:19

## 2025-02-20 RX ADMIN — Medication 10 MG: at 09:03

## 2025-02-20 RX ADMIN — FENTANYL CITRATE 50 MCG: 50 INJECTION INTRAMUSCULAR; INTRAVENOUS at 08:26

## 2025-02-20 RX ADMIN — FENTANYL CITRATE 50 MCG: 50 INJECTION INTRAMUSCULAR; INTRAVENOUS at 09:03

## 2025-02-20 RX ADMIN — ACETAMINOPHEN 1000 MG: 500 TABLET ORAL at 07:52

## 2025-02-20 RX ADMIN — DEXMEDETOMIDINE 8 MCG: 100 INJECTION, SOLUTION INTRAVENOUS at 09:02

## 2025-02-20 RX ADMIN — GLYCOPYRROLATE 0.2 MG: 0.2 INJECTION INTRAMUSCULAR; INTRAVENOUS at 09:18

## 2025-02-20 RX ADMIN — DEXMEDETOMIDINE 4 MCG: 100 INJECTION, SOLUTION INTRAVENOUS at 09:31

## 2025-02-20 RX ADMIN — MIDAZOLAM 2 MG: 1 INJECTION INTRAMUSCULAR; INTRAVENOUS at 08:19

## 2025-02-20 RX ADMIN — PROPOFOL 150 MG: 10 INJECTION, EMULSION INTRAVENOUS at 08:26

## 2025-02-20 RX ADMIN — PROPOFOL 150 MCG/KG/MIN: 10 INJECTION, EMULSION INTRAVENOUS at 08:28

## 2025-02-20 RX ADMIN — PROPOFOL 50 MG: 10 INJECTION, EMULSION INTRAVENOUS at 09:36

## 2025-02-20 RX ADMIN — DEXAMETHASONE SODIUM PHOSPHATE 8 MG: 4 INJECTION INTRA-ARTICULAR; INTRALESIONAL; INTRAMUSCULAR; INTRAVENOUS; SOFT TISSUE at 08:31

## 2025-02-20 RX ADMIN — PROPOFOL 50 MG: 10 INJECTION, EMULSION INTRAVENOUS at 09:34

## 2025-02-20 RX ADMIN — Medication 40 MG: at 08:26

## 2025-02-20 RX ADMIN — SUGAMMADEX 200 MG: 100 INJECTION, SOLUTION INTRAVENOUS at 09:29

## 2025-02-20 RX ADMIN — SODIUM CHLORIDE: 900 INJECTION, SOLUTION INTRAVENOUS at 08:19

## 2025-02-20 RX ADMIN — ONDANSETRON 4 MG: 2 INJECTION, SOLUTION INTRAMUSCULAR; INTRAVENOUS at 08:31

## 2025-02-20 RX ADMIN — FENTANYL CITRATE 50 MCG: 50 INJECTION INTRAMUSCULAR; INTRAVENOUS at 09:31

## 2025-02-20 ASSESSMENT — PAIN SCALES - GENERAL
PAINLEVEL_OUTOF10: 0

## 2025-02-20 ASSESSMENT — PAIN - FUNCTIONAL ASSESSMENT
PAIN_FUNCTIONAL_ASSESSMENT: 0-10

## 2025-02-20 ASSESSMENT — PAIN DESCRIPTION - LOCATION: LOCATION: ABDOMEN

## 2025-02-20 NOTE — ANESTHESIA PRE PROCEDURE
Department of Anesthesiology  Preprocedure Note       Name:  Radha Cortés   Age:  34 y.o.  :  1990                                          MRN:  [id number]         Date:  2025      Surgeon: Surgeon(s):  Aydin Heath MD    Procedure: Procedure(s):  REPAIR UMBILICAL HERNIA    Medications prior to admission:   Prior to Admission medications    Medication Sig Start Date End Date Taking? Authorizing Provider   norgestrel-ethinyl estradiol (CRYSELLE-28) 0.3-30 MG-MCG per tablet Take 1 tablet by mouth daily   Yes Provider, MD Yissel       Current medications:    Current Facility-Administered Medications   Medication Dose Route Frequency Provider Last Rate Last Admin   • naloxone 0.4 mg in 10 mL sodium chloride syringe   IntraVENous PRN Chase Gonsalves MD       • lactated ringers infusion   IntraVENous Continuous Chase Gonsalves MD       • HYDROmorphone (DILAUDID) injection 0.5 mg  0.5 mg IntraVENous Q5 Min PRN Chase Gonsalves MD       • HYDROmorphone (DILAUDID) injection 0.5 mg  0.5 mg IntraVENous Q5 Min PRN Chase Gonsalves MD       • ondansetron (ZOFRAN) injection 4 mg  4 mg IntraVENous Once PRN Chase Gonsalves MD       • diphenhydrAMINE (BENADRYL) injection 12.5 mg  12.5 mg IntraVENous Once PRN Chase Gonsalves MD       • lidocaine PF 1 % injection 1 mL  1 mL IntraDERmal Once PRN Chase Gonsalves MD       • fentaNYL (SUBLIMAZE) injection 100 mcg  100 mcg IntraVENous Once PRN Chase Gonsalves MD       • 0.9 % sodium chloride infusion   IntraVENous Continuous Chase Gonsalves MD   New Bag at 25 0819   • midazolam (VERSED) injection 2 mg  2 mg IntraVENous Once PRN Chase Gonsalves MD       • sodium chloride 0.9 % infusion              Facility-Administered Medications Ordered in Other Encounters   Medication Dose Route Frequency Provider Last Rate Last Admin   • fentaNYL (SUBLIMAZE) injection   IntraVENous Once PRN Chase Gonsalves MD   50 mcg at 25 0903   • midazolam (VERSED) injection

## 2025-02-20 NOTE — PERIOP NOTE
AudreyMarbella Sanchez Moo  1990  [id number]    Situation:  Verbal report given from: Dr. Gonsalves  Procedure: Procedure(s):  REPAIR UMBILICAL HERNIA    Background:    Preoperative diagnosis: Umbilical hernia [K42.9]    Postoperative diagnosis: * No post-op diagnosis entered *    :  Dr. Heath    Assistant(s): Circulator: Lydia Ortez RN  Scrub Person First: Nano Honeycutt RN    Specimens: * No specimens in log *    Assessment:  Intra-procedure medications         Anesthesia gave intra-procedure sedation and medications, see anesthesia flow sheet     Intravenous fluids: LR@ KVO     Vital signs stable

## 2025-02-20 NOTE — BRIEF OP NOTE
Brief Postoperative Note      Patient: Radha Cortés  YOB: 1990  MRN: [id number]    Date of Procedure: 2/20/2025    Pre-Op Diagnosis: Umbilical Hernia.     Post-Op Diagnosis:  Same.       Procedure:   Repair Umbilical Hernia.     Surgeon(s):  Aydin Heath MD    Assistant:  * No surgical staff found *    Anesthesia: General    Estimated Blood Loss (mL): Approximately 5 ml.    Complications: None    Specimens:   * No specimens in log *    Implants:  * No implants in log *      Drains: * No LDAs found *    Findings:  Infection Present At Time Of Surgery (PATOS) (choose all levels that have infection present):  No infection present  Other Findings: Umbilical hernia - less than 3 cm in length, not incarcerated.    Electronically signed by Aydin Heath MD on 2/20/2025 at 9:44 AM

## 2025-02-20 NOTE — OP NOTE
[facility]               [address]  [address]                            OPERATIVE REPORT      PATIENT NAME: MARY HAIRSTONB: 1990  MED REC NO: [id number]                       ROOM: OR  ACCOUNT NO: [id number]                       ADMIT DATE: 02/20/2025  PROVIDER: Aydin Heath MD    DATE OF SERVICE:  02/20/2025    PREOPERATIVE DIAGNOSES:  Umbilical hernia.    POSTOPERATIVE DIAGNOSES:  Umbilical hernia.    PROCEDURES PERFORMED:  Repair of umbilical hernia.    SURGEON:  Aydin Heath MD    ASSISTANT:  ***    ANESTHESIA:  General endotracheal.    ESTIMATED BLOOD LOSS:  Approximately 5 mL.    SPECIMENS REMOVED:  None.    INTRAOPERATIVE FINDINGS:  ***     COMPLICATIONS:  None.    IMPLANTS:  ***    INDICATIONS:  The patient is a 34-year-old female with a symptomatic umbilical hernia.  The patient was brought to the operating at this time for open repair possibly with mesh.  The risks of the procedure including, but not limited to infection, bleeding, and hernia recurrence were discussed in detail with the patient via .  The patient understood and wished to proceed.    DESCRIPTION OF PROCEDURE:  After consent was obtained, the patient was brought to the operating room where she was placed in the supine position on the operating room table.  Following the induction of an adequate level of general anesthesia via the endotracheal tube, compression devices were placed on both lower extremities.  Abdomen was prepped with ChloraPrep and draped as a sterile field.  Local anesthetic was infiltrated and an infraumbilical incision was opened sharply.  Subcutaneous bleeders were carefully cauterized.  Incision was carried down to the hernia sac which was readily identified and dissected free circumferentially.  There did not appear to be incarcerated peritoneal contents within the hernia sac.  The hernia sac was mobilized down to the edge of the

## 2025-02-20 NOTE — ANESTHESIA POSTPROCEDURE EVALUATION
Department of Anesthesiology  Postprocedure Note    Patient: Radha Cortés  MRN: [id number]  YOB: 1990  Date of evaluation: 2/20/2025    Procedure Summary       Date: 02/20/25 Room / Location: [facility] MAIN OR  / [facility] MAIN OR    Anesthesia Start: 0819 Anesthesia Stop: 0956    Procedure: REPAIR UMBILICAL HERNIA (Abdomen) Diagnosis:       Umbilical hernia      (Umbilical hernia [K42.9])    Surgeons: Aydin Heath MD Responsible Provider: Chase Gonsalves MD    Anesthesia Type: general ASA Status: 1            Anesthesia Type: No value filed.    Stephanie Phase I: Stephanie Score: 10    Stephanie Phase II:      Anesthesia Post Evaluation    Patient location during evaluation: PACU  Patient participation: complete - patient participated  Level of consciousness: awake  Airway patency: patent  Nausea & Vomiting: no vomiting and no nausea  Cardiovascular status: hemodynamically stable  Respiratory status: acceptable  Hydration status: stable  Pain management: adequate    No notable events documented.

## 2025-02-20 NOTE — H&P
Ms. Cortés has no c/o today.  Afebrile VSS  No intake or output data in the 24 hours ending 02/20/25 8425   Exam: Cor: RRR.              Lungs: Bilateral breath sounds.               Abd: Soft. Non distended.             Non tender.             No guarding or rebound.             No change in the umbilical hernia.  Labs: No results found for this or any previous visit (from the past 12 hour(s)).   To OR for umbilical hernia repair, possibly with mesh.   Discussed procedure with Ms. Cortés, via a , including risks of bleeding, infection, hernia recurrence.   She understands and wishes to proceed.   Consent on chart.   NPO for now.

## 2025-02-20 NOTE — DISCHARGE INSTRUCTIONS
Patient Discharge Instructions    Radha Sanchez Cobrandyn / [id number] : 1990    Admitted 2025 Discharged: 2025   ? Es importante que tome los medicamentos exactamente jefferson le long sido recetados.  ? Guarde los medicamentos en los frascos que le proporcionó el farmacéutico y lleve en orourke billetera joao lista con los nombres de los medicamentos, las dosis y los horarios en que debe tomarlos.  ? No tome otros medicamentos sin consultar con orourke médico.    Qué hacer en casa    Dieta recomendada: regular.    Actividad recomendada: no levantar objetos pesados (menos de 10 a 15 libras).    No conduzca mientras janet oxicodona.    Tylenol 1000 mg cada 6 horas cyn dos días y luego 1000 mg cada 6 horas según sea necesario para el dolor.    Compresa de hielo en el abdomen según sea necesario.    Oxicodona según sea necesario para el dolor intenso.    Puede ducharse en 48 horas.    Si experimenta alguno de los siguientes síntomas: fiebre, escalofríos, náuseas, vómitos, enrojecimiento o supuración en el lugar de la cirugía o cualquier otra pregunta o inquietud, llame al [phone].    Seguimiento con el Dr. Heath en 10 a 14 scott.    Información obtenida por:  Entiendo que si ocurre algún problema joao vez que esté en casa, daiana comunicarme con mi médico.    Entiendo y reconozco jose recibido las instrucciones indicadas anteriormente.    Firma del médico o enfermera registrada Fecha/Hora    Firma del paciente o representante Fecha/Hora    RESUMEN DEL LENARD de la enfermera    INSTRUCCIONES PARA EL PACIENTE:    Después de la anestesia general o sedación intravenosa, cyn 24 horas o mientras janet narcóticos recetados:  ? Limite vera actividades  ? No conduzca ni opere maquinaria peligrosa  ? No tome decisiones personales o comerciales importantes  ? No parminder bebidas alcohólicas  ? Si no ha orinado dentro de las 8 horas posteriores al lenard, comuníquese con orourke cirujano de pete.    Informe lo

## 2025-03-06 ENCOUNTER — OFFICE VISIT (OUTPATIENT)
Age: 35
End: 2025-03-06

## 2025-03-06 VITALS
DIASTOLIC BLOOD PRESSURE: 68 MMHG | BODY MASS INDEX: 28.57 KG/M2 | WEIGHT: 132.4 LBS | HEIGHT: 57 IN | TEMPERATURE: 97.9 F | SYSTOLIC BLOOD PRESSURE: 104 MMHG | HEART RATE: 65 BPM | RESPIRATION RATE: 16 BRPM | OXYGEN SATURATION: 91 %

## 2025-03-06 DIAGNOSIS — Z09 POSTOP CHECK: Primary | ICD-10-CM

## 2025-03-06 DIAGNOSIS — K42.9 UMBILICAL HERNIA WITHOUT OBSTRUCTION AND WITHOUT GANGRENE: ICD-10-CM

## 2025-03-06 PROCEDURE — 99212 OFFICE O/P EST SF 10 MIN: CPT | Performed by: NURSE PRACTITIONER

## 2025-03-06 ASSESSMENT — PATIENT HEALTH QUESTIONNAIRE - PHQ9
SUM OF ALL RESPONSES TO PHQ QUESTIONS 1-9: 0
SUM OF ALL RESPONSES TO PHQ QUESTIONS 1-9: 0
2. FEELING DOWN, DEPRESSED OR HOPELESS: NOT AT ALL
SUM OF ALL RESPONSES TO PHQ QUESTIONS 1-9: 0
1. LITTLE INTEREST OR PLEASURE IN DOING THINGS: NOT AT ALL
SUM OF ALL RESPONSES TO PHQ QUESTIONS 1-9: 0

## 2025-03-06 NOTE — PROGRESS NOTES
Identified patient with two patient identifiers (name and ). Reviewed chart in preparation for visit and have obtained necessary documentation.    Radha Cortés is a 34 y.o. female  Chief Complaint   Patient presents with    Post-Op Check     2 week s/p REPAIR UMBILICAL HERNIA DOS 25     /68 (Site: Left Upper Arm, Position: Sitting, Cuff Size: Large Adult)   Pulse 65   Temp 97.9 °F (36.6 °C) (Oral)   Resp 16   Ht 1.448 m (4' 9\")   Wt 60.1 kg (132 lb 6.4 oz)   SpO2 91%   BMI 28.65 kg/m²     1. Have you been to the ER, urgent care clinic since your last visit?  Hospitalized since your last visit?no    2. Have you seen or consulted any other health care providers outside of the [facility] since your last visit?  Include any pap smears or colon screening. no

## 2025-03-06 NOTE — PROGRESS NOTES
Subjective:      Radha Cortés is a 34 y.o. female presents for postop care  1 weeks status post repair of umbilical hernia repair.   Appetite is good. Eating a regular diet without difficulty.   Bowel movements are regular.  The patient is voiding without difficulty.  The patient is not having any pain..        Ms. Daniel Cortés has a reminder for a \"due or due soon\" health maintenance. I have asked that she contact her primary care provider for follow-up on this health maintenance.      Objective:     /68 (Site: Left Upper Arm, Position: Sitting, Cuff Size: Large Adult)   Pulse 65   Temp 97.9 °F (36.6 °C) (Oral)   Resp 16   Ht 1.448 m (4' 9\")   Wt 60.1 kg (132 lb 6.4 oz)   SpO2 91%   BMI 28.65 kg/m²     General:  alert, cooperative   Abdomen: soft, non-tender   Incision:   healing well, no drainage, no erythema, no dehiscence, incision well approximated     Assessment:     Doing well postoperatively.    Plan:     Follow up as needed  May increase activity as tolerated.   No lifting greater than 10-20 lbs x1 week then may increase by 10 lbs each week.   May get incisions wet.   May use tylenol/ibuprofen as needed for pain.   ASIF Ceballos - NP

## 2025-05-09 ENCOUNTER — TELEPHONE (OUTPATIENT)
Age: 35
End: 2025-05-09

## 2025-05-09 NOTE — TELEPHONE ENCOUNTER
[facility]  [phone]    05/09/25 11:16 AM Please see referral tab for additional information. Pt does not meet qualifications for a colposcopy at this time. Lucy Flores RN

## (undated) DEVICE — KIT,1200CC CANISTER,3/16"X6' TUBING: Brand: MEDLINE INDUSTRIES, INC.

## (undated) DEVICE — COVER,MAYO STAND,STERILE: Brand: MEDLINE

## (undated) DEVICE — LIQUIBAND RAPID ADHESIVE 36/CS 0.8ML: Brand: MEDLINE

## (undated) DEVICE — APPLICATOR MEDICATED 26 CC SOLUTION HI LT ORNG CHLORAPREP

## (undated) DEVICE — SUTURE MONOCRYL SZ 4-0 L27IN ABSRB UD L19MM PS-2 1/2 CIR PRIM Y426H

## (undated) DEVICE — COVER LT HNDL PLAS RIG 1 PER PK

## (undated) DEVICE — PENCIL ES CRD L10FT HND SWCHING ROCK SWCH W/ EDGE COAT BLDE

## (undated) DEVICE — SYRINGE MED 10ML LUERLOCK TIP W/O SFTY DISP

## (undated) DEVICE — DRAPE,LAPAROTOMY,T,PEDI,STERILE: Brand: MEDLINE

## (undated) DEVICE — BASIN ST MAJOR-NO CAUTERY: Brand: MEDLINE INDUSTRIES, INC.

## (undated) DEVICE — GLOVE ORANGE PI 8   MSG9080

## (undated) DEVICE — SUTURE VICRYL + SZ 2-0 L27IN ABSRB WHT SH 1/2 CIR TAPERCUT VCP417H

## (undated) DEVICE — HYPODERMIC SAFETY NEEDLE: Brand: MONOJECT

## (undated) DEVICE — TOWEL,OR,DSP,ST,BLUE,STD,4/PK,20PK/CS: Brand: MEDLINE

## (undated) DEVICE — ELECTRODE PT RET AD L9FT HI MOIST COND ADH HYDRGEL CORDED

## (undated) DEVICE — SUTURE VICRYL SZ 1 L27IN ABSRB VLT L26MM CT-2 1/2 CIR J335H